# Patient Record
Sex: FEMALE | Employment: FULL TIME | URBAN - METROPOLITAN AREA
[De-identification: names, ages, dates, MRNs, and addresses within clinical notes are randomized per-mention and may not be internally consistent; named-entity substitution may affect disease eponyms.]

---

## 2022-08-23 NOTE — PROGRESS NOTES
New patient is a 29 y.o.  who is seen for vaginal issues related to intercourse. Complains of postcoital bleeding about 50% of the time as well as dyspareunia. Intermittent for the past 3 mo. Improves after a few days of no sex notices improvement and then eventually recurs. Dyspareunia is in pelvis with deep thrusts and also vaginal.     When she has PCB, is noticeable only when wiping, does not need pad or liner, resolves within an hour. Does not occur with every act of intercourse. Reports that when having intercourse she uses lubrications however after she notices some vaginal swelling and discomfort, primarily at introitus. She notes that is appears red and irritated, maybe cracked, heals after a few days then gets re injured after having sex again. she thinks that she might have allergic reactions to lubrications because she has tried multiple brands. She also states about 2 months ago she had an odor from the opening of her vagina and thought she had a yeast infection; used otc monistat. After using the monistat her odor and discomfort resolved. Today she states that her symptoms are slowly returning. She mentions that typically after intercourse she will develop either some type of infection; UTI, BV, yeast. Denies abnormal discharge. Denies vaginal itching. No known history of herpes and std's. Reports partner is large and pain seems to be positional.    Her friend recommended our office as she is a current patient here. Menarche was age 17. Cycle length 28 and lasting 5-7 days. Denies dysmenorrhea. Denies menorrhagia. No known history of uterine fibroids, PCOS, ovarian cyst, endometriosis. Not on any birth control. Last pap smear: over a year ago and wnl per patient. No hx of abnormal paps, colposcopy or leep. Mammogram: NA  Colonoscopy: NA  Dexa: NA    HISTORY:  Patient's last menstrual period was 2022 (approximate).   Sexual History:  has sex with males  Contraception:  none  No current outpatient medications on file prior to visit. No current facility-administered medications on file prior to visit. ROS:  Feeling well. No dyspnea or chest pain on exertion. No abdominal pain, change in bowel habits, black or bloody stools. No urinary tract symptoms. GYN ROS: she complains of PCB and dyspareunia pelvic and vaginal.    PHYSICAL EXAM:  Blood pressure (!) 100/50, height 5' 4\" (1.626 m), weight 110 lb 6.4 oz (50.1 kg), last menstrual period 08/18/2022, not currently breastfeeding. The patient appears well, alert, oriented x 3, in no distress. Pelvic: normal external genitalia, vulva, vagina, cervix, uterus and adnexa, VULVA: normal appearing vulva with no masses, tenderness or lesions, VAGINA: normal appearing vagina with normal color and discharge, no lesions, posterior aspect of introitus appears to have had a healing area of possible fissure or injury, pt identifies this area as a major area of pain that seems to heal and then become reinjured with intercourse CERVIX: normal appearing cervix without discharge or lesions, ectropion visible, UTERUS: uterus is normal size, shape, consistency and nontender, ADNEXA: normal adnexa in size, nontender and no masses. ASSESSMENT:  Encounter Diagnoses   Name Primary? Acute vaginitis     Dyspareunia in female     Postcoital bleeding     Vaginal odor     Vaginal irritation Yes       PLAN:  All questions answered  Diagnosis explained in detail, including differential  Lengthy disc with pt on her sx  Check nuswab vaginitis plus to r/o infxn. Suspect trauma with intercourse at posterior fourchette/introitus. Will send rx estrace cream to be used 1-2/daily pea sized amount for next few weeks to help with healing. May need to avoid intercourse for a week or so to allow for healing.    Avoid painful sex positions, continue lubricants  This area may be source of spotting after intercourse but will rtc for US to evaluate gyn organs to investigate   Will need wwe also. Orders Placed This Encounter   Procedures    Nuswab Vaginitis Plus (VG+)     Standing Status:   Future     Standing Expiration Date:   8/24/2023         Supervising physician is Dr. Aimee Robles.    45 min chart review, exam, counseling and documentation

## 2022-08-24 ENCOUNTER — OFFICE VISIT (OUTPATIENT)
Dept: OBGYN CLINIC | Age: 32
End: 2022-08-24

## 2022-08-24 VITALS
BODY MASS INDEX: 18.85 KG/M2 | HEIGHT: 64 IN | WEIGHT: 110.4 LBS | DIASTOLIC BLOOD PRESSURE: 50 MMHG | SYSTOLIC BLOOD PRESSURE: 100 MMHG

## 2022-08-24 DIAGNOSIS — N76.0 ACUTE VAGINITIS: ICD-10-CM

## 2022-08-24 DIAGNOSIS — N94.10 DYSPAREUNIA IN FEMALE: ICD-10-CM

## 2022-08-24 DIAGNOSIS — N93.0 POSTCOITAL BLEEDING: ICD-10-CM

## 2022-08-24 DIAGNOSIS — N89.8 VAGINAL ODOR: ICD-10-CM

## 2022-08-24 DIAGNOSIS — N89.8 VAGINAL IRRITATION: Primary | ICD-10-CM

## 2022-08-24 PROCEDURE — 99204 OFFICE O/P NEW MOD 45 MIN: CPT | Performed by: NURSE PRACTITIONER

## 2022-08-24 RX ORDER — ESTRADIOL 0.1 MG/G
CREAM VAGINAL
Qty: 42.5 G | Refills: 0 | Status: SHIPPED | OUTPATIENT
Start: 2022-08-24

## 2022-08-27 LAB
A VAGINAE DNA VAG QL NAA+PROBE: NORMAL SCORE
BVAB2 DNA VAG QL NAA+PROBE: NORMAL SCORE
C ALBICANS DNA VAG QL NAA+PROBE: NEGATIVE
C GLABRATA DNA VAG QL NAA+PROBE: NEGATIVE
C TRACH RRNA SPEC QL NAA+PROBE: NEGATIVE
MEGA1 DNA VAG QL NAA+PROBE: NORMAL SCORE
N GONORRHOEA RRNA SPEC QL NAA+PROBE: NEGATIVE
SPECIMEN SOURCE: NORMAL
T VAGINALIS RRNA SPEC QL NAA+PROBE: NEGATIVE